# Patient Record
Sex: FEMALE | URBAN - METROPOLITAN AREA
[De-identification: names, ages, dates, MRNs, and addresses within clinical notes are randomized per-mention and may not be internally consistent; named-entity substitution may affect disease eponyms.]

---

## 2020-08-11 ENCOUNTER — OFFICE VISIT (OUTPATIENT)
Dept: URBAN - METROPOLITAN AREA CLINIC 62 | Facility: CLINIC | Age: 55
End: 2020-08-11
Payer: MEDICARE

## 2020-08-11 DIAGNOSIS — H04.123 DRY EYE SYNDROME OF BILATERAL LACRIMAL GLANDS: Chronic | ICD-10-CM

## 2020-08-11 DIAGNOSIS — H25.13 AGE-RELATED NUCLEAR CATARACT, BILATERAL: Chronic | ICD-10-CM

## 2020-08-11 DIAGNOSIS — H10.812 PINGUECULITIS, LEFT EYE: Primary | Chronic | ICD-10-CM

## 2020-08-11 PROCEDURE — 92004 COMPRE OPH EXAM NEW PT 1/>: CPT | Performed by: OPTOMETRIST

## 2020-08-11 RX ORDER — PREDNISOLONE ACETATE 10 MG/ML
1 % SUSPENSION/ DROPS OPHTHALMIC
Qty: 1 | Refills: 0 | Status: INACTIVE
Start: 2020-08-11 | End: 2021-10-22

## 2020-08-11 ASSESSMENT — INTRAOCULAR PRESSURE
OS: 16
OD: 15

## 2020-08-11 NOTE — IMPRESSION/PLAN
Impression: Pingueculitis, left eye: H10.812. Plan: Discussed diagnosis with patient in detail. Start Prednisolone OS QID x 1 week then BID x 1 weeks and D/C. Will continue to observe condition and/or symptoms. Patient instructed to call if condition gets worse.

## 2021-10-22 ENCOUNTER — OFFICE VISIT (OUTPATIENT)
Dept: URBAN - METROPOLITAN AREA CLINIC 60 | Facility: CLINIC | Age: 56
End: 2021-10-22
Payer: MEDICARE

## 2021-10-22 DIAGNOSIS — H16.223 KERATOCONJUNCTIVITIS SICCA, BILATERAL: Primary | ICD-10-CM

## 2021-10-22 DIAGNOSIS — H52.4 PRESBYOPIA: ICD-10-CM

## 2021-10-22 PROCEDURE — 92004 COMPRE OPH EXAM NEW PT 1/>: CPT | Performed by: OPTOMETRIST

## 2021-10-22 RX ORDER — MINERAL OIL AND WHITE PETROLATUM 30; 940 MG/G; MG/G
OINTMENT OPHTHALMIC
Qty: 3.5 | Refills: 11 | Status: ACTIVE
Start: 2021-10-22

## 2021-10-22 RX ORDER — PROPYLENE GLYCOL 0.06 MG/ML
0.6 % SOLUTION/ DROPS OPHTHALMIC
Qty: 15 | Refills: 11 | Status: ACTIVE
Start: 2021-10-22

## 2021-10-22 ASSESSMENT — VISUAL ACUITY
OS: 20/25
OD: 20/25

## 2021-10-22 ASSESSMENT — INTRAOCULAR PRESSURE
OS: 12
OD: 14

## 2021-10-22 NOTE — IMPRESSION/PLAN
Impression: Keratoconjunctivitis sicca, bilateral: I04.314. Plan: Diagnosis discussed with patient. Recommend patient use artificial tears 4 times a day and use a gel or ointment QHS OU. Erx'd artificial tears and gel to patient's pharmacy. Patient to be consistent with tears for at least one week to notice a difference in symptoms. If symptoms do not improve with tears alone, patient to call office and will start her on a prescription dry eye drop.